# Patient Record
Sex: FEMALE | Race: WHITE | Employment: OTHER
[De-identification: names, ages, dates, MRNs, and addresses within clinical notes are randomized per-mention and may not be internally consistent; named-entity substitution may affect disease eponyms.]

---

## 2020-06-08 LAB
AVERAGE GLUCOSE: NORMAL
HBA1C MFR BLD: 5.2 %

## 2022-10-27 ENCOUNTER — OFFICE VISIT (OUTPATIENT)
Dept: FAMILY MEDICINE CLINIC | Facility: CLINIC | Age: 37
End: 2022-10-27
Payer: COMMERCIAL

## 2022-10-27 VITALS
WEIGHT: 114 LBS | RESPIRATION RATE: 16 BRPM | HEART RATE: 76 BPM | HEIGHT: 62 IN | DIASTOLIC BLOOD PRESSURE: 60 MMHG | OXYGEN SATURATION: 98 % | BODY MASS INDEX: 20.98 KG/M2 | SYSTOLIC BLOOD PRESSURE: 100 MMHG | TEMPERATURE: 97.8 F

## 2022-10-27 DIAGNOSIS — F41.1 GAD (GENERALIZED ANXIETY DISORDER): ICD-10-CM

## 2022-10-27 DIAGNOSIS — F33.9 MAJOR DEPRESSION, RECURRENT, CHRONIC (HCC): Primary | ICD-10-CM

## 2022-10-27 PROCEDURE — 99204 OFFICE O/P NEW MOD 45 MIN: CPT | Performed by: FAMILY MEDICINE

## 2022-10-27 RX ORDER — ATOMOXETINE 18 MG/1
18 CAPSULE ORAL DAILY
Qty: 30 CAPSULE | Refills: 0 | Status: SHIPPED | OUTPATIENT
Start: 2022-10-27 | End: 2022-11-10 | Stop reason: SDUPTHER

## 2022-10-27 ASSESSMENT — PATIENT HEALTH QUESTIONNAIRE - PHQ9
SUM OF ALL RESPONSES TO PHQ QUESTIONS 1-9: 16
8. MOVING OR SPEAKING SO SLOWLY THAT OTHER PEOPLE COULD HAVE NOTICED. OR THE OPPOSITE, BEING SO FIGETY OR RESTLESS THAT YOU HAVE BEEN MOVING AROUND A LOT MORE THAN USUAL: 2
3. TROUBLE FALLING OR STAYING ASLEEP: 2
1. LITTLE INTEREST OR PLEASURE IN DOING THINGS: 3
SUM OF ALL RESPONSES TO PHQ QUESTIONS 1-9: 16
5. POOR APPETITE OR OVEREATING: 0
SUM OF ALL RESPONSES TO PHQ9 QUESTIONS 1 & 2: 6
10. IF YOU CHECKED OFF ANY PROBLEMS, HOW DIFFICULT HAVE THESE PROBLEMS MADE IT FOR YOU TO DO YOUR WORK, TAKE CARE OF THINGS AT HOME, OR GET ALONG WITH OTHER PEOPLE: 2
6. FEELING BAD ABOUT YOURSELF - OR THAT YOU ARE A FAILURE OR HAVE LET YOURSELF OR YOUR FAMILY DOWN: 3
9. THOUGHTS THAT YOU WOULD BE BETTER OFF DEAD, OR OF HURTING YOURSELF: 0
4. FEELING TIRED OR HAVING LITTLE ENERGY: 2
SUM OF ALL RESPONSES TO PHQ QUESTIONS 1-9: 16
7. TROUBLE CONCENTRATING ON THINGS, SUCH AS READING THE NEWSPAPER OR WATCHING TELEVISION: 1
SUM OF ALL RESPONSES TO PHQ QUESTIONS 1-9: 16
2. FEELING DOWN, DEPRESSED OR HOPELESS: 3

## 2022-10-27 ASSESSMENT — ENCOUNTER SYMPTOMS
ABDOMINAL PAIN: 0
VOMITING: 0
SHORTNESS OF BREATH: 0
NAUSEA: 0
DIARRHEA: 0
SINUS PAIN: 0
WHEEZING: 0
COUGH: 0

## 2022-10-27 NOTE — PROGRESS NOTES
Kaitlin Scanlon (:  1985) is a 44 y.o. female,New patient, here for evaluation of the following chief complaint(s):  New Patient and Depression         ASSESSMENT/PLAN:  1. Major depression, recurrent, chronic (HCC)  -     atomoxetine (STRATTERA) 18 MG capsule; Take 1 capsule by mouth daily, Disp-30 capsule, R-0Normal  2. JOVON (generalized anxiety disorder)  -     atomoxetine (STRATTERA) 18 MG capsule; Take 1 capsule by mouth daily, Disp-30 capsule, R-0Normal    PHQ- 9 score is 16 today. Start Strattera- discussed possible side effects including possible SI/ HI- to call 911 or go to the ER if this happens- patient understands and agrees. She refused Counseling or referral to Psychiatry now. Obtained consent to get previous records. Return for 2 wks- f/u depressin, JOVON- virtual; 1 mth- , physical, fasting labs or prn sooner. Subjective   SUBJECTIVE/OBJECTIVE:  DepressionPatient presents with the following symptoms: decreased concentration and nervousness/anxiety. Patient is not experiencing: confusion, palpitations, shortness of breath and suicidal ideas. New patient to the practice, here to establish care. She cannot remember her previous PCP's name, saw Saskia Arzate- Dr Aurea Valdes in [place], [place]. She moved here in 2022, her last Pap was in 2022, LMP- 2022. Chronic Depression, JOVON, ADD- diagnosed around age 12- 14, tried different medications, has weaned herself off medication in the past. Her previous  did not believe in mental health issues and medication. She was on Sertraline 50 mg for the last 4 years, not been on medication for 2 months now as she ran out and did not have insurance. Today she c/o feeling tired all the time though she is sleeping well- gets about 10 hours a night, feels nervous and anxious, little things make her overwhelmed, struggles with focus- keeps lists to help her.  She has been on ADD medications in the past- last was Adderall 20 mg in [place] when she was working. Her last Psychiatrist prescribed something apparently but was not covered. Review of Systems   Constitutional:  Positive for fatigue. Negative for chills and fever. HENT:  Negative for congestion and sinus pain. Respiratory:  Negative for cough, shortness of breath and wheezing. Cardiovascular:  Negative for chest pain, palpitations and leg swelling. Gastrointestinal:  Negative for abdominal pain, diarrhea, nausea and vomiting. Psychiatric/Behavioral:  Positive for decreased concentration, depression and dysphoric mood. Negative for agitation, behavioral problems, confusion, hallucinations, self-injury, sleep disturbance and suicidal ideas. The patient is nervous/anxious. The patient is not hyperactive. Objective   Physical Exam  Vitals and nursing note reviewed. Constitutional:       General: She is not in acute distress. Appearance: She is not ill-appearing. HENT:      Mouth/Throat:      Mouth: Mucous membranes are moist.      Pharynx: Oropharynx is clear. Eyes:      Conjunctiva/sclera: Conjunctivae normal.      Pupils: Pupils are equal, round, and reactive to light. Neck:      Comments: No thyromegaly  Cardiovascular:      Rate and Rhythm: Normal rate and regular rhythm. Pulmonary:      Effort: Pulmonary effort is normal.      Breath sounds: Normal breath sounds. Abdominal:      General: Bowel sounds are normal.      Palpations: Abdomen is soft. Tenderness: There is no abdominal tenderness. Musculoskeletal:      Cervical back: Neck supple. No tenderness. Right lower leg: No edema. Left lower leg: No edema. Neurological:      Mental Status: She is alert. Psychiatric:      Comments: Slightly depressed and anxious appearing, good eye contact                An electronic signature was used to authenticate this note.     --Humberto Stanford MD

## 2022-11-10 ENCOUNTER — TELEMEDICINE (OUTPATIENT)
Dept: FAMILY MEDICINE CLINIC | Facility: CLINIC | Age: 37
End: 2022-11-10
Payer: COMMERCIAL

## 2022-11-10 DIAGNOSIS — F33.9 MAJOR DEPRESSION, RECURRENT, CHRONIC (HCC): Primary | ICD-10-CM

## 2022-11-10 DIAGNOSIS — F41.1 GAD (GENERALIZED ANXIETY DISORDER): ICD-10-CM

## 2022-11-10 PROCEDURE — 99214 OFFICE O/P EST MOD 30 MIN: CPT | Performed by: FAMILY MEDICINE

## 2022-11-10 RX ORDER — ATOMOXETINE 18 MG/1
18 CAPSULE ORAL DAILY
Qty: 30 CAPSULE | Refills: 0 | Status: SHIPPED | OUTPATIENT
Start: 2022-11-10 | End: 2022-11-22 | Stop reason: SINTOL

## 2022-11-10 ASSESSMENT — ENCOUNTER SYMPTOMS
VOMITING: 0
DIARRHEA: 0
WHEEZING: 0
SHORTNESS OF BREATH: 0
ABDOMINAL PAIN: 0
SINUS PAIN: 0
NAUSEA: 0
COUGH: 0

## 2022-11-10 NOTE — PROGRESS NOTES
Michelle Franks (:  1985) is a Established patient, here for evaluation of the following:    Assessment & Plan   Below is the assessment and plan developed based on review of pertinent history, physical exam, labs, studies, and medications. 1. Major depression, recurrent, chronic (HCC)  -     atomoxetine (STRATTERA) 18 MG capsule; Take 1 capsule by mouth daily, Disp-30 capsule, R-0Normal  2. JOVON (generalized anxiety disorder)  -     atomoxetine (STRATTERA) 18 MG capsule; Take 1 capsule by mouth daily, Disp-30 capsule, R-0Normal    Patient prefers to continue the Strattera for now instead of going back to Sertraline or trying something else. Advised to try taking the Strattera at [facility] and see if that helps with the headaches, to continue the same dose for another 2-3 weeks and see how she feels, consider increase in dose if needed. Return for has an appt on 22 or prn sooner. Subjective   Anxiety  Symptoms include decreased concentration and nervous/anxious behavior. Patient reports no chest pain, dizziness, nausea, palpitations, shortness of breath or suicidal ideas. DepressionPatient presents with the following symptoms: decreased concentration and nervousness/anxiety. Patient is not experiencing: palpitations, shortness of breath and suicidal ideas. Virtual visit for follow up of-  Chronic Depression, JOVON, ADD- diagnosed around age 12- 14, tried different medications, has weaned herself off medication in the past. Her previous  did not believe in mental health issues and medication. She was started on Strattera 18 mg about 2 weeks ago, says it has helped a lot overall with her anxiety, not as overwhelmed as before, able to focus much better and got a lot done, not feeling tired, not helped much with depression, reports however having a headache for about 2 hours and then resolved.  She was on Sertraline 50 mg for the last 4 years, not been on medication for 2 months now as she ran out and did not have insurance. She is sleeping well- may wake up in the middle of the night thinking she has to check on something, gets about 7-8 hours a night. She has been on ADD medications in the past- last was Adderall 20 mg in [facility] when she was working. Her last Psychiatrist prescribed something apparently but was not covered. Review of Systems   Constitutional:  Negative for chills and fever. HENT:  Negative for congestion and sinus pain. Respiratory:  Negative for cough, shortness of breath and wheezing. Cardiovascular:  Negative for chest pain, palpitations and leg swelling. Gastrointestinal:  Negative for abdominal pain, diarrhea, nausea and vomiting. Neurological:  Positive for headaches. Negative for dizziness and light-headedness. Psychiatric/Behavioral:  Positive for decreased concentration, depression and dysphoric mood. Negative for self-injury, sleep disturbance and suicidal ideas. The patient is nervous/anxious. The patient is not hyperactive. Objective   Patient-Reported Vitals  No data recorded     Physical Exam       Vital Signs: (As obtained by patient/caregiver at home)  There were no vitals taken for this visit    Constitutional - appears well-developed and well-nourished, no apparent distress  Mental status - alert and awake, able to follow commands  Eyes - sclera normal, no discharge visible bilaterally  Head - normocephalic atraumatic  ENT - mouth throat-mucous membranes are moist; external ears are normal  Neck - no visualized mass  Pulmonary/chest - respiratory effort is normal, no visualized respiratory distress  Psychiatric - minimally anxious, very cheerful, good eye contact, no hallucinations        Laurita Suazo, was evaluated through a synchronous (real-time) audio-video encounter. The patient (or guardian if applicable) is aware that this is a billable service, which includes applicable co-pays.  This Virtual Visit was conducted with patient's (and/or legal guardian's) consent. The visit was conducted pursuant to the emergency declaration under the [facility] [address] authority and the [employer] and [employer] General Act. Patient identification was verified, and a caregiver was present when appropriate. The patient was located at Home: [address] [place] [address]. Provider was located at [facility] ([address] [place] [address]): [address] [facility],  [address] [facility].         --Renée Reyes MD

## 2022-11-22 ENCOUNTER — TELEPHONE (OUTPATIENT)
Dept: FAMILY MEDICINE CLINIC | Facility: CLINIC | Age: 37
End: 2022-11-22

## 2022-11-22 NOTE — TELEPHONE ENCOUNTER
Please advise to DC Strattera, start Sertraline 50 mg- to only take 1/2 tab po daily for 5 days then increase to 1 tab daily.

## 2022-11-22 NOTE — TELEPHONE ENCOUNTER
Pt. Called in stating the new medication is making her depression worst, and she is still having headaches, pt. Wants to be put back on the Zoloft. Pt. Uses [id number]

## 2022-11-23 NOTE — TELEPHONE ENCOUNTER
Called and left a detailed message on patient voicemail told patient to stop taking the Strattrea and start to the Zoloft 50 mg with half tab for five days then taking the whole.

## 2023-01-06 ENCOUNTER — TELEPHONE (OUTPATIENT)
Dept: FAMILY MEDICINE CLINIC | Facility: CLINIC | Age: 38
End: 2023-01-06

## 2023-01-06 NOTE — TELEPHONE ENCOUNTER
Patient is going out of town and is in need of a refill for sertraline (ZOLOFT) 50 MG tablet  Please send to [facility] on [address].

## 2023-02-08 ENCOUNTER — TELEPHONE (OUTPATIENT)
Dept: FAMILY MEDICINE CLINIC | Facility: CLINIC | Age: 38
End: 2023-02-08

## 2023-02-15 ENCOUNTER — OFFICE VISIT (OUTPATIENT)
Dept: FAMILY MEDICINE CLINIC | Facility: CLINIC | Age: 38
End: 2023-02-15

## 2023-02-15 VITALS
HEART RATE: 75 BPM | BODY MASS INDEX: 21.35 KG/M2 | WEIGHT: 116 LBS | RESPIRATION RATE: 16 BRPM | TEMPERATURE: 98.4 F | DIASTOLIC BLOOD PRESSURE: 68 MMHG | OXYGEN SATURATION: 99 % | HEIGHT: 62 IN | SYSTOLIC BLOOD PRESSURE: 110 MMHG

## 2023-02-15 DIAGNOSIS — Z23 ENCOUNTER FOR IMMUNIZATION: ICD-10-CM

## 2023-02-15 DIAGNOSIS — F41.1 GAD (GENERALIZED ANXIETY DISORDER): ICD-10-CM

## 2023-02-15 DIAGNOSIS — Z00.00 PHYSICAL EXAM, ANNUAL: Primary | ICD-10-CM

## 2023-02-15 DIAGNOSIS — F33.9 MAJOR DEPRESSION, RECURRENT, CHRONIC (HCC): ICD-10-CM

## 2023-02-15 DIAGNOSIS — Z11.4 ENCOUNTER FOR SCREENING FOR HIV: ICD-10-CM

## 2023-02-15 SDOH — ECONOMIC STABILITY: FOOD INSECURITY: WITHIN THE PAST 12 MONTHS, YOU WORRIED THAT YOUR FOOD WOULD RUN OUT BEFORE YOU GOT MONEY TO BUY MORE.: NEVER TRUE

## 2023-02-15 SDOH — ECONOMIC STABILITY: HOUSING INSECURITY
IN THE LAST 12 MONTHS, WAS THERE A TIME WHEN YOU DID NOT HAVE A STEADY PLACE TO SLEEP OR SLEPT IN A SHELTER (INCLUDING NOW)?: NO

## 2023-02-15 SDOH — ECONOMIC STABILITY: FOOD INSECURITY: WITHIN THE PAST 12 MONTHS, THE FOOD YOU BOUGHT JUST DIDN'T LAST AND YOU DIDN'T HAVE MONEY TO GET MORE.: NEVER TRUE

## 2023-02-15 SDOH — ECONOMIC STABILITY: INCOME INSECURITY: HOW HARD IS IT FOR YOU TO PAY FOR THE VERY BASICS LIKE FOOD, HOUSING, MEDICAL CARE, AND HEATING?: NOT HARD AT ALL

## 2023-02-15 ASSESSMENT — ENCOUNTER SYMPTOMS
EYE PAIN: 0
ABDOMINAL PAIN: 0
BACK PAIN: 0
SHORTNESS OF BREATH: 0
VOMITING: 0
SORE THROAT: 0
DIARRHEA: 0
COUGH: 0
WHEEZING: 0
SINUS PAIN: 0
PHOTOPHOBIA: 0
BLOOD IN STOOL: 0
NAUSEA: 0

## 2023-02-15 ASSESSMENT — PATIENT HEALTH QUESTIONNAIRE - PHQ9
SUM OF ALL RESPONSES TO PHQ QUESTIONS 1-9: 0
SUM OF ALL RESPONSES TO PHQ9 QUESTIONS 1 & 2: 0
1. LITTLE INTEREST OR PLEASURE IN DOING THINGS: 0
2. FEELING DOWN, DEPRESSED OR HOPELESS: 0

## 2023-02-15 NOTE — PROGRESS NOTES
2/15/2023    Laurita Yin (:  1985) is a [age] [facility] y.o. female, here for a preventive medicine evaluation. Patient comes today for a physical, says last physical was over a year ago, not fasting today for labs. Her last Pap test was on 22, then she had the [place] IUD inserted on 22, she denies any abnormal Paps in the past, she denies any vaginal spotting, bleeding or abnormal discharge. She has not had a a period since she had the IUD inserted. She has never had a mammogram done before, she denies any breast complaints or concerns, denies any family h/o breast cancer. She had a colonoscopy in  for rectal bleeding- polyps were removed, denies any GI symptoms, denies any family history of colon cancer. She has never had a bone density. She gets an eye exam every year but her last exam was in . She has not had her teeth cleaned in the last 1.5 years. She is not sure when her last TDaP vaccine was, has not taken the flu vaccine this year, she has taken 3 doses of the Covid vaccine so far. Chronic Depression, JOVON, ADD- diagnosed around age 12- 14, tried different medications, has weaned herself off medication in the past. Her previous  did not believe in mental health issues and medication. (She tried Strattera 18 mg- helped a lot overall with her anxiety, not as overwhelmed as before, able to focus much better and got a lot done, not feeling tired, not helped much with depression, reports however having a headache for about 2 hours and then resolved.) She was on Sertraline 50 mg for the last 4-5 years, now back on it for 2 months now - denies any side effects. 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 She is sleeping well- may wake up in the middle of the night thinking she has to check on something, gets about 7-8 hours a night.  She feels about 85% controlled. (She has been on ADD medications in the past- last was Adderall 20 mg in [facility] when she was working. Her last Psychiatrist prescribed something apparently but was not covered.)                Patient Active Problem List   Diagnosis    Major depression, recurrent, chronic (HCC)    JOVON (generalized anxiety disorder)    Encounter for immunization       Review of Systems   Constitutional:  Negative for chills, fatigue and fever. HENT:  Negative for congestion, ear pain, postnasal drip, sinus pain, sneezing and sore throat. Eyes:  Negative for photophobia, pain and visual disturbance. Respiratory:  Negative for cough, shortness of breath and wheezing. Cardiovascular:  Negative for chest pain, palpitations and leg swelling. Gastrointestinal:  Negative for abdominal pain, blood in stool, diarrhea, nausea and vomiting. Endocrine: Negative for cold intolerance and heat intolerance. Genitourinary:  Negative for difficulty urinating, dysuria, flank pain, frequency, hematuria and urgency. Musculoskeletal:  Negative for arthralgias, back pain and myalgias. Skin:  Negative for pallor and rash. Neurological:  Negative for dizziness, weakness, light-headedness, numbness and headaches. Psychiatric/Behavioral:  Negative for agitation, dysphoric mood, self-injury, sleep disturbance and suicidal ideas. The patient is nervous/anxious. The patient is not hyperactive. Prior to Visit Medications    Medication Sig Taking?  Authorizing Provider   sertraline (ZOLOFT) 50 MG tablet Take 1 tablet by mouth daily Yes Feli Naik MD        Allergies   Allergen Reactions    Doxycycline Rash       Past Medical History:   Diagnosis Date    Anxiety     Chicken pox     Depression        Past Surgical History:   Procedure Laterality Date    COLONOSCOPY  2016       Social History     Socioeconomic History    Marital status:      Spouse name: Not on file    Number of children: 2    Years of education: Not on file    Highest education level: Not on file   Occupational History    Occupation: Housewife   Tobacco Use    Smoking status: Former     Packs/day: 0.20     Years: 10.00     Pack years: 2.00     Types: Cigarettes     Start date:      Quit date: 2022     Years since quittin.5    Smokeless tobacco: Never   Vaping Use    Vaping Use: Never used   Substance and Sexual Activity    Alcohol use: Yes     Comment: 1-3 drinks 2-3 days a week    Drug use: Never    Sexual activity: Yes     Partners: Male   Other Topics Concern    Not on file   Social History Narrative    Not on file     Social Determinants of Health     Financial Resource Strain: Low Risk     Difficulty of Paying Living Expenses: Not hard at all   Food Insecurity: No Food Insecurity    Worried About Running Out of Food in the Last Year: Never true    [address] in the Last Year: Never true   Transportation Needs: Unknown    Lack of Transportation (Medical): Not on file    Lack of Transportation (Non-Medical): No   Physical Activity: Not on file   Stress: Not on file   Social Connections: Not on file   Intimate Partner Violence: Not on file   Housing Stability: Unknown    Unable to Pay for Housing in the Last Year: Not on file    Number of Places Lived in the Last Year: Not on file    Unstable Housing in the Last Year: No        Family History   Problem Relation Age of Onset    High Blood Pressure Mother     Depression Mother     Hearing Loss Mother     Depression Father     Drug Abuse Sister     Depression Sister     Heart Disease Maternal Grandfather 66        heart stent       ADVANCE DIRECTIVE: N, <no information>    Vitals:    02/15/23 1402   BP: 110/68   Pulse: 75   Resp: 16   Temp: 98.4 °F (36.9 °C)   TempSrc: Tympanic   SpO2: 99%   Weight: 116 lb (52.6 kg)   Height: 5' 2\" (1.575 m)     Estimated body mass index is 21.22 kg/m² as calculated from the following:    Height as of this encounter: 5' 2\" (1.575 m).     Weight as of this encounter: 116 lb (52.6 kg). Physical Exam  Vitals and nursing note reviewed. Constitutional:       General: She is not in acute distress. HENT:      Head: Normocephalic and atraumatic. Right Ear: Tympanic membrane and ear canal normal.      Left Ear: Tympanic membrane and ear canal normal.      Nose: Nose normal. No congestion. Mouth/Throat:      Mouth: Mucous membranes are moist.      Pharynx: Oropharynx is clear. Eyes:      Extraocular Movements: Extraocular movements intact. Conjunctiva/sclera: Conjunctivae normal.      Pupils: Pupils are equal, round, and reactive to light. Neck:      Vascular: No carotid bruit. Comments: No thyromegaly  Cardiovascular:      Rate and Rhythm: Normal rate and regular rhythm. Pulmonary:      Effort: Pulmonary effort is normal. No respiratory distress. Breath sounds: Normal breath sounds. Comments: Bilateral breasts appear normal, no suspicious masses, no skin or nipple changes, no nipple discharge or axillary nodes    Abdominal:      General: Bowel sounds are normal.      Palpations: Abdomen is soft. Tenderness: There is no abdominal tenderness. There is no right CVA tenderness or left CVA tenderness. Genitourinary:     Comments: Denies concerns, refused Pap. Musculoskeletal:      Cervical back: Neck supple. No tenderness. Right lower leg: No edema. Left lower leg: No edema. Skin:     General: Skin is warm and dry. Neurological:      Mental Status: She is alert and oriented to person, place, and time. Mental status is at baseline. Motor: No weakness. Gait: Gait normal.      Deep Tendon Reflexes: Reflexes normal.   Psychiatric:      Comments: Minimally anxious       No flowsheet data found.     Lab Results   Component Value Date/Time    CHOL 163 02/17/2023 09:43 AM    TRIG 103 02/17/2023 09:43 AM    HDL 85 02/17/2023 09:43 AM    LDLCALC 57.4 02/17/2023 09:43 AM    GLUCOSE 94 02/17/2023 09:43 AM LABA1C 5.2 06/08/2020 12:00 AM       The ASCVD Risk score (Laine JACQUES, et al., 2019) failed to calculate for the following reasons:    The 2019 ASCVD risk score is only valid for ages 40 to 79    Immunization History   Administered Date(s) Administered    Influenza Virus Vaccine 10/26/2020    Tdap (Boostrix, Adacel) 02/15/2023       Health Maintenance   Topic Date Due    COVID-19 Vaccine (1) Never done    Flu vaccine (1) 08/01/2022    Depression Monitoring  02/15/2024    Cervical cancer screen  05/02/2027    DTaP/Tdap/Td vaccine (2 - Td or Tdap) 02/15/2033    Hepatitis C screen  Completed    HIV screen  Completed    Hepatitis A vaccine  Aged Out    Hib vaccine  Aged Out    Meningococcal (ACWY) vaccine  Aged Out    Pneumococcal 0-64 years Vaccine  Aged Out    Varicella vaccine  Discontinued       Assessment & Plan   Physical exam, annual  -     CBC with Auto Differential; Future  -     Comprehensive Metabolic Panel; Future  -     Lipid Panel; Future  Encounter for screening for HIV  -     HIV 1/2 Ag/Ab, 4TH Generation,W Rflx Confirm; Future  Encounter for immunization  -     Tdap, BOOSTRIX, (age 10 yrs+), IM  Major depression, recurrent, chronic (HCC)  -     sertraline (ZOLOFT) 50 MG tablet; Take 1 tablet by mouth daily, Disp-90 tablet, R-1Normal  JOVON (generalized anxiety disorder)  -     sertraline (ZOLOFT) 50 MG tablet; Take 1 tablet by mouth daily, Disp-90 tablet, R-1Normal    Await labs.  TDaP vaccine was given today, advised to take the Flu and Covid boosters at the pharmacy.  Advised to schedule regular vision exams and dental cleanings.  Await records of ADD diagnosis. Continue Zoloft for now, discussed other medications that do not cause sexual side effects but she does not want to switch now.    Return for 2-3 days- fasting labs; 6 mths- f/u depression, anxiety; 1 yr- physical, fasting labs or prn sooner.         --Ghislaine Andrade MD

## 2023-02-17 ENCOUNTER — NURSE ONLY (OUTPATIENT)
Dept: FAMILY MEDICINE CLINIC | Facility: CLINIC | Age: 38
End: 2023-02-17

## 2023-02-17 DIAGNOSIS — Z11.4 ENCOUNTER FOR SCREENING FOR HIV: ICD-10-CM

## 2023-02-17 DIAGNOSIS — Z00.00 PHYSICAL EXAM, ANNUAL: ICD-10-CM

## 2023-02-17 LAB
ALBUMIN SERPL-MCNC: 4.2 G/DL (ref 3.5–5)
ALBUMIN/GLOB SERPL: 1.4 (ref 0.4–1.6)
ALP SERPL-CCNC: 95 U/L (ref 50–136)
ALT SERPL-CCNC: 17 U/L (ref 12–65)
ANION GAP SERPL CALC-SCNC: 3 MMOL/L (ref 2–11)
AST SERPL-CCNC: 15 U/L (ref 15–37)
BASOPHILS # BLD: 0 K/UL (ref 0–0.2)
BASOPHILS NFR BLD: 1 % (ref 0–2)
BILIRUB SERPL-MCNC: 0.6 MG/DL (ref 0.2–1.1)
BUN SERPL-MCNC: 15 MG/DL (ref 6–23)
CALCIUM SERPL-MCNC: 9.1 MG/DL (ref 8.3–10.4)
CHLORIDE SERPL-SCNC: 107 MMOL/L (ref 101–110)
CHOLEST SERPL-MCNC: 163 MG/DL
CO2 SERPL-SCNC: 29 MMOL/L (ref 21–32)
CREAT SERPL-MCNC: 0.8 MG/DL (ref 0.6–1)
DIFFERENTIAL METHOD BLD: ABNORMAL
EOSINOPHIL # BLD: 0.1 K/UL (ref 0–0.8)
EOSINOPHIL NFR BLD: 2 % (ref 0.5–7.8)
ERYTHROCYTE [DISTWIDTH] IN BLOOD BY AUTOMATED COUNT: 11.7 % (ref 11.9–14.6)
GLOBULIN SER CALC-MCNC: 3.1 G/DL (ref 2.8–4.5)
GLUCOSE SERPL-MCNC: 94 MG/DL (ref 65–100)
HCT VFR BLD AUTO: 36.7 % (ref 35.8–46.3)
HDLC SERPL-MCNC: 85 MG/DL (ref 40–60)
HDLC SERPL: 1.9
HGB BLD-MCNC: 12 G/DL (ref 11.7–15.4)
HIV 1+2 AB+HIV1 P24 AG SERPL QL IA: NONREACTIVE
HIV 1/2 RESULT COMMENT: NORMAL
IMM GRANULOCYTES # BLD AUTO: 0 K/UL (ref 0–0.5)
IMM GRANULOCYTES NFR BLD AUTO: 0 % (ref 0–5)
LDLC SERPL CALC-MCNC: 57.4 MG/DL
LYMPHOCYTES # BLD: 2 K/UL (ref 0.5–4.6)
LYMPHOCYTES NFR BLD: 30 % (ref 13–44)
MCH RBC QN AUTO: 31.4 PG (ref 26.1–32.9)
MCHC RBC AUTO-ENTMCNC: 32.7 G/DL (ref 31.4–35)
MCV RBC AUTO: 96.1 FL (ref 82–102)
MONOCYTES # BLD: 0.3 K/UL (ref 0.1–1.3)
MONOCYTES NFR BLD: 5 % (ref 4–12)
NEUTS SEG # BLD: 4.4 K/UL (ref 1.7–8.2)
NEUTS SEG NFR BLD: 62 % (ref 43–78)
NRBC # BLD: 0 K/UL (ref 0–0.2)
PLATELET # BLD AUTO: 252 K/UL (ref 150–450)
PMV BLD AUTO: 10.9 FL (ref 9.4–12.3)
POTASSIUM SERPL-SCNC: 4 MMOL/L (ref 3.5–5.1)
PROT SERPL-MCNC: 7.3 G/DL (ref 6.3–8.2)
RBC # BLD AUTO: 3.82 M/UL (ref 4.05–5.2)
SODIUM SERPL-SCNC: 139 MMOL/L (ref 133–143)
TRIGL SERPL-MCNC: 103 MG/DL (ref 35–150)
VLDLC SERPL CALC-MCNC: 20.6 MG/DL (ref 6–23)
WBC # BLD AUTO: 6.9 K/UL (ref 4.3–11.1)

## 2023-03-15 ENCOUNTER — TELEPHONE (OUTPATIENT)
Dept: FAMILY MEDICINE CLINIC | Facility: CLINIC | Age: 38
End: 2023-03-15

## 2023-03-15 NOTE — TELEPHONE ENCOUNTER
Please call patient and let her know that we received her medical records for ADD.  Please advise to schedule an appointment for medication refill- can be virtual.

## 2023-03-17 ENCOUNTER — TELEMEDICINE (OUTPATIENT)
Dept: FAMILY MEDICINE CLINIC | Facility: CLINIC | Age: 38
End: 2023-03-17

## 2023-03-17 DIAGNOSIS — F98.8 ATTENTION DEFICIT DISORDER (ADD) WITHOUT HYPERACTIVITY: Primary | ICD-10-CM

## 2023-03-17 RX ORDER — DEXTROAMPHETAMINE SACCHARATE, AMPHETAMINE ASPARTATE MONOHYDRATE, DEXTROAMPHETAMINE SULFATE AND AMPHETAMINE SULFATE 5; 5; 5; 5 MG/1; MG/1; MG/1; MG/1
20 CAPSULE, EXTENDED RELEASE ORAL EVERY MORNING
Qty: 30 CAPSULE | Refills: 0 | Status: SHIPPED | OUTPATIENT
Start: 2023-04-16 | End: 2023-05-16

## 2023-03-17 RX ORDER — DEXTROAMPHETAMINE SACCHARATE, AMPHETAMINE ASPARTATE MONOHYDRATE, DEXTROAMPHETAMINE SULFATE AND AMPHETAMINE SULFATE 5; 5; 5; 5 MG/1; MG/1; MG/1; MG/1
20 CAPSULE, EXTENDED RELEASE ORAL EVERY MORNING
Qty: 30 CAPSULE | Refills: 0 | Status: SHIPPED | OUTPATIENT
Start: 2023-03-17 | End: 2023-04-16

## 2023-03-17 ASSESSMENT — ENCOUNTER SYMPTOMS
COUGH: 0
SWOLLEN GLANDS: 0
SORE THROAT: 0
VOMITING: 0
VISUAL CHANGE: 0
ABDOMINAL PAIN: 0
NAUSEA: 0
CHANGE IN BOWEL HABIT: 0

## 2023-03-17 NOTE — PROGRESS NOTES
Lisandra Garrido (:  1985) is a Established patient, here for evaluation of the following:    Assessment & Plan   Below is the assessment and plan developed based on review of pertinent history, physical exam, labs, studies, and medications. 1. Attention deficit disorder (ADD) without hyperactivity  -     amphetamine-dextroamphetamine (ADDERALL XR) 20 MG extended release capsule; Take 1 capsule by mouth every morning for 30 days. Max Daily Amount: 20 mg, Disp-30 capsule, R-0Normal  -     amphetamine-dextroamphetamine (ADDERALL XR) 20 MG extended release capsule; Take 1 capsule by mouth every morning for 30 days. Max Daily Amount: 20 mg, Disp-30 capsule, R-0Normal    Reviewed records from previous provider and discussed with patient. I have reviewed the patients controlled substance prescription history, as maintained in the [place] prescription monitoring program, so that the prescription(s) for a controlled substance can be given. Needs UDS and CSA. Restart Adderall XR 20 mg daily in am- prescribed for 2 months now. Return for 2 mths- f/u ADD, CS refll. Subjective   ADD  This is a chronic problem. Episode onset: in college in her early 25s; stopped taking Adderall in her pregnancies and during breast feeding. The problem occurs constantly. The problem has been gradually worsening (has not taken Adderall since 2022). Pertinent negatives include no abdominal pain, anorexia, arthralgias, change in bowel habit, chest pain, chills, congestion, coughing, fatigue, fever, headaches, joint swelling, myalgias, nausea, neck pain, numbness, rash, sore throat, swollen glands, urinary symptoms, vertigo, visual change, vomiting or weakness. Associated symptoms comments: Depression off and on- takes Zoloft 50 mg, mood swings from feeling frustarted. 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 Treatments tried:  Adderall 20 mg- reports a sudden onset of help after taking it and a quick drop when it stops working, she tried a higher dose as well as the XR, Zoloft 50 mg. Review of Systems   Constitutional:  Negative for chills, fatigue and fever. HENT:  Negative for congestion, sinus pain and sore throat. Respiratory:  Negative for cough, shortness of breath and wheezing. Cardiovascular:  Negative for chest pain, palpitations and leg swelling. Gastrointestinal:  Negative for abdominal pain, anorexia, change in bowel habit, diarrhea, nausea and vomiting. Musculoskeletal:  Negative for arthralgias, joint swelling, myalgias and neck pain. Skin:  Negative for rash. Neurological:  Negative for vertigo, weakness, numbness and headaches. Psychiatric/Behavioral:  Positive for decreased concentration and dysphoric mood. Negative for self-injury, sleep disturbance and suicidal ideas. The patient is not nervous/anxious and is not hyperactive. Objective   Patient-Reported Vitals  No data recorded     Physical Exam       Vital Signs: (As obtained by patient/caregiver at home)  There were no vitals taken for this visit    Constitutional - appears well-developed and well-nourished, no apparent distress  Mental status - alert and awake, able to follow commands  Eyes - sclera normal, no discharge visible bilaterally  Head - normocephalic atraumatic  ENT - mouth throat-mucous membranes are moist; external ears are normal  Neck - no visualized mass  Pulmonary/chest - respiratory effort is normal, no visualized respiratory distress  Psychiatric - normal mood and affect, no hallucinations, slightly overwhelmed        Julia White, was evaluated through a synchronous (real-time) audio-video encounter. The patient (or guardian if applicable) is aware that this is a billable service, which includes applicable co-pays. This Virtual Visit was conducted with patient's (and/or legal guardian's) consent.  The visit was conducted pursuant to the emergency declaration under the [address], [facility] authority and the [employer] and [employer] General Act. Patient identification was verified, and a caregiver was present when appropriate.    The patient was located at Home: [address] [place] [id number]  Provider was located at [place] [address] ([facility]): [address] [facility],  [address] [facility]         --Carley Cm MD

## 2023-03-18 ASSESSMENT — ENCOUNTER SYMPTOMS
DIARRHEA: 0
SHORTNESS OF BREATH: 0
WHEEZING: 0
SINUS PAIN: 0